# Patient Record
Sex: MALE | Race: WHITE | Employment: FULL TIME | URBAN - METROPOLITAN AREA
[De-identification: names, ages, dates, MRNs, and addresses within clinical notes are randomized per-mention and may not be internally consistent; named-entity substitution may affect disease eponyms.]

---

## 2018-07-30 ENCOUNTER — HOSPITAL ENCOUNTER (EMERGENCY)
Age: 18
Discharge: HOME | End: 2018-07-30
Attending: EMERGENCY MEDICINE
Payer: COMMERCIAL

## 2018-07-30 VITALS
DIASTOLIC BLOOD PRESSURE: 76 MMHG | WEIGHT: 147 LBS | TEMPERATURE: 98 F | OXYGEN SATURATION: 100 % | RESPIRATION RATE: 14 BRPM | BODY MASS INDEX: 20.58 KG/M2 | HEART RATE: 97 BPM | HEIGHT: 71 IN | SYSTOLIC BLOOD PRESSURE: 127 MMHG

## 2018-07-30 DIAGNOSIS — S81.031A PUNCTURE WOUND OF RIGHT KNEE, INITIAL ENCOUNTER: Primary | ICD-10-CM

## 2018-07-30 PROCEDURE — 99282 EMERGENCY DEPT VISIT SF MDM: CPT

## 2018-07-30 ASSESSMENT — PAIN SCALES - GENERAL: PAINLEVEL_OUTOF10: 3

## 2018-07-30 ASSESSMENT — PAIN DESCRIPTION - PAIN TYPE: TYPE: ACUTE PAIN

## 2018-07-30 ASSESSMENT — PAIN DESCRIPTION - DESCRIPTORS: DESCRIPTORS: ACHING;THROBBING

## 2018-07-30 ASSESSMENT — PAIN DESCRIPTION - FREQUENCY: FREQUENCY: CONTINUOUS

## 2018-07-30 ASSESSMENT — PAIN DESCRIPTION - LOCATION: LOCATION: KNEE

## 2018-07-31 NOTE — ED PROVIDER NOTES
Triage Chief Complaint:   Puncture Wound (right knee punctured with nail off a pallet)      [place] P[place] is a 21 y.o. male that presents after injuring his right knee. Patient states he lifted a pallet did notice there was a nail that scratched his right knee. He has a very superficial puncture wound that will not require sutures. His tetanus status is up-to-date. Injury occurred at work before arrival in the emergency department    ROS:  Review of systems was reviewed for 10 systems and is otherwise negative except as in the [address]    History reviewed. No pertinent past medical history. History reviewed. No pertinent surgical history. History reviewed. No pertinent family history. Social History     Social History    Marital status: Single     Spouse name: N/A    Number of children: N/A    Years of education: N/A     Occupational History    Not on file. Social History Main Topics    Smoking status: Never Smoker    Smokeless tobacco: Not on file    Alcohol use Yes    Drug use: No    Sexual activity: Not on file     Other Topics Concern    Not on file     Social History Narrative    No narrative on file     No current facility-administered medications for this encounter. No current outpatient prescriptions on file. Allergies   Allergen Reactions    Cephalosporins Hives    Sulfa Antibiotics Hives     Nursing Notes Reviewed    Physical Exam:  ED Triage Vitals [07/30/18 2127]   Enc Vitals Group      /76      Heart Rate 97      Resp 14      Temp 98 °F (36.7 °C)      Temp src       SpO2 100 %      Weight 147 lb (66.7 kg)      Height 5' 11\" (1.803 m)      Head Circumference       Peak Flow       Pain Score       Pain Loc       Pain Edu? Excl. in [address]?         GENERAL APPEARANCE: A well-developed well-nourished 49-year-old very pleasant white male in no acute distress  HEAD: Normocephalic, atraumatic  EXTREMITIES: No acute deformities, no peripheral edema, would have centimeter

## 2018-07-31 NOTE — ED NOTES
Cleaned puncture wound with HIBI clens and sterile water, right knee. Pt tolerated well.      [address], [place]  07/30/18 [id number]